# Patient Record
Sex: FEMALE | Race: WHITE | NOT HISPANIC OR LATINO | Employment: FULL TIME | URBAN - METROPOLITAN AREA
[De-identification: names, ages, dates, MRNs, and addresses within clinical notes are randomized per-mention and may not be internally consistent; named-entity substitution may affect disease eponyms.]

---

## 2024-08-14 PROBLEM — F17.200 SMOKER: Status: ACTIVE | Noted: 2024-08-14

## 2024-08-14 PROBLEM — I47.10 SVT (SUPRAVENTRICULAR TACHYCARDIA) (CMS-HCC): Status: ACTIVE | Noted: 2024-08-14

## 2024-08-15 ENCOUNTER — LAB (OUTPATIENT)
Dept: LAB | Facility: LAB | Age: 44
End: 2024-08-15
Payer: COMMERCIAL

## 2024-08-15 DIAGNOSIS — D50.0 IRON DEFICIENCY ANEMIA DUE TO CHRONIC BLOOD LOSS: ICD-10-CM

## 2024-08-15 DIAGNOSIS — Z00.00 WELLNESS EXAMINATION: ICD-10-CM

## 2024-08-15 LAB
ALBUMIN SERPL BCP-MCNC: 4.3 G/DL (ref 3.4–5)
ALP SERPL-CCNC: 36 U/L (ref 33–110)
ALT SERPL W P-5'-P-CCNC: 10 U/L (ref 7–45)
ANION GAP SERPL CALC-SCNC: 10 MMOL/L (ref 10–20)
AST SERPL W P-5'-P-CCNC: 13 U/L (ref 9–39)
BILIRUB SERPL-MCNC: 0.3 MG/DL (ref 0–1.2)
BUN SERPL-MCNC: 12 MG/DL (ref 6–23)
CALCIUM SERPL-MCNC: 9.1 MG/DL (ref 8.6–10.3)
CHLORIDE SERPL-SCNC: 106 MMOL/L (ref 98–107)
CHOLEST SERPL-MCNC: 165 MG/DL (ref 0–199)
CHOLESTEROL/HDL RATIO: 3.3
CO2 SERPL-SCNC: 26 MMOL/L (ref 21–32)
CREAT SERPL-MCNC: 0.78 MG/DL (ref 0.5–1.05)
EGFRCR SERPLBLD CKD-EPI 2021: >90 ML/MIN/1.73M*2
ERYTHROCYTE [DISTWIDTH] IN BLOOD BY AUTOMATED COUNT: 18 % (ref 11.5–14.5)
EST. AVERAGE GLUCOSE BLD GHB EST-MCNC: 105 MG/DL
GLUCOSE SERPL-MCNC: 95 MG/DL (ref 74–99)
HBA1C MFR BLD: 5.3 %
HCT VFR BLD AUTO: 36.2 % (ref 36–46)
HDLC SERPL-MCNC: 50.1 MG/DL
HGB BLD-MCNC: 11.4 G/DL (ref 12–16)
LDLC SERPL CALC-MCNC: 103 MG/DL
MCH RBC QN AUTO: 25.1 PG (ref 26–34)
MCHC RBC AUTO-ENTMCNC: 31.5 G/DL (ref 32–36)
MCV RBC AUTO: 80 FL (ref 80–100)
NON HDL CHOLESTEROL: 115 MG/DL (ref 0–149)
NRBC BLD-RTO: 0 /100 WBCS (ref 0–0)
PLATELET # BLD AUTO: 218 X10*3/UL (ref 150–450)
POTASSIUM SERPL-SCNC: 4.4 MMOL/L (ref 3.5–5.3)
PROT SERPL-MCNC: 7.5 G/DL (ref 6.4–8.2)
RBC # BLD AUTO: 4.55 X10*6/UL (ref 4–5.2)
SODIUM SERPL-SCNC: 138 MMOL/L (ref 136–145)
TRIGL SERPL-MCNC: 61 MG/DL (ref 0–149)
TSH SERPL-ACNC: 1.63 MIU/L (ref 0.44–3.98)
VIT B12 SERPL-MCNC: 457 PG/ML (ref 211–911)
VLDL: 12 MG/DL (ref 0–40)
WBC # BLD AUTO: 5.1 X10*3/UL (ref 4.4–11.3)

## 2024-08-15 PROCEDURE — 82728 ASSAY OF FERRITIN: CPT

## 2024-08-15 PROCEDURE — 83036 HEMOGLOBIN GLYCOSYLATED A1C: CPT

## 2024-08-15 PROCEDURE — 85027 COMPLETE CBC AUTOMATED: CPT

## 2024-08-15 PROCEDURE — 36415 COLL VENOUS BLD VENIPUNCTURE: CPT

## 2024-08-15 PROCEDURE — 80053 COMPREHEN METABOLIC PANEL: CPT

## 2024-08-15 PROCEDURE — 80061 LIPID PANEL: CPT

## 2024-08-15 PROCEDURE — 83550 IRON BINDING TEST: CPT

## 2024-08-15 PROCEDURE — 82652 VIT D 1 25-DIHYDROXY: CPT

## 2024-08-15 PROCEDURE — 84443 ASSAY THYROID STIM HORMONE: CPT

## 2024-08-15 PROCEDURE — 82607 VITAMIN B-12: CPT

## 2024-08-15 PROCEDURE — 83540 ASSAY OF IRON: CPT

## 2024-08-17 DIAGNOSIS — K62.5 BRIGHT RED RECTAL BLEEDING: Primary | ICD-10-CM

## 2024-08-17 DIAGNOSIS — D50.0 IRON DEFICIENCY ANEMIA DUE TO CHRONIC BLOOD LOSS: ICD-10-CM

## 2024-08-17 LAB
1,25(OH)2D SERPL-MCNC: 39.6 PG/ML (ref 19.9–79.3)
FERRITIN SERPL-MCNC: 9 NG/ML (ref 8–150)
IRON SATN MFR SERPL: 5 % (ref 25–45)
IRON SERPL-MCNC: 20 UG/DL (ref 35–150)
TIBC SERPL-MCNC: 417 UG/DL (ref 240–445)
UIBC SERPL-MCNC: 397 UG/DL (ref 110–370)

## 2024-08-17 RX ORDER — FERROUS SULFATE 325(65) MG
325 TABLET, DELAYED RELEASE (ENTERIC COATED) ORAL
Start: 2024-08-17 | End: 2025-08-17

## 2024-09-04 ENCOUNTER — HOSPITAL ENCOUNTER (OUTPATIENT)
Dept: RADIOLOGY | Facility: CLINIC | Age: 44
End: 2024-09-04
Payer: COMMERCIAL

## 2024-09-04 ENCOUNTER — HOSPITAL ENCOUNTER (OUTPATIENT)
Dept: RADIOLOGY | Facility: CLINIC | Age: 44
Discharge: HOME | End: 2024-09-04
Payer: COMMERCIAL

## 2024-09-04 VITALS — BODY MASS INDEX: 22.16 KG/M2 | HEIGHT: 65 IN | WEIGHT: 133 LBS

## 2024-09-04 DIAGNOSIS — Z12.39 ENCOUNTER FOR OTHER SCREENING FOR MALIGNANT NEOPLASM OF BREAST: ICD-10-CM

## 2024-09-04 DIAGNOSIS — Z12.31 SCREENING MAMMOGRAM FOR BREAST CANCER: ICD-10-CM

## 2024-09-04 PROCEDURE — 77067 SCR MAMMO BI INCL CAD: CPT

## 2024-09-17 ENCOUNTER — HOSPITAL ENCOUNTER (OUTPATIENT)
Dept: RADIOLOGY | Facility: EXTERNAL LOCATION | Age: 44
Discharge: HOME | End: 2024-09-17

## 2024-09-18 ENCOUNTER — ANESTHESIA EVENT (OUTPATIENT)
Dept: GASTROENTEROLOGY | Facility: EXTERNAL LOCATION | Age: 44
End: 2024-09-18

## 2024-09-25 ENCOUNTER — APPOINTMENT (OUTPATIENT)
Dept: GASTROENTEROLOGY | Facility: EXTERNAL LOCATION | Age: 44
End: 2024-09-25
Payer: COMMERCIAL

## 2024-09-25 ENCOUNTER — ANESTHESIA (OUTPATIENT)
Dept: GASTROENTEROLOGY | Facility: EXTERNAL LOCATION | Age: 44
End: 2024-09-25

## 2024-09-25 VITALS
OXYGEN SATURATION: 100 % | BODY MASS INDEX: 21.66 KG/M2 | HEART RATE: 70 BPM | DIASTOLIC BLOOD PRESSURE: 68 MMHG | TEMPERATURE: 97.9 F | SYSTOLIC BLOOD PRESSURE: 99 MMHG | RESPIRATION RATE: 16 BRPM | WEIGHT: 130 LBS | HEIGHT: 65 IN

## 2024-09-25 DIAGNOSIS — Z83.719 FAMILY HISTORY OF COLON POLYPS, UNSPECIFIED: ICD-10-CM

## 2024-09-25 DIAGNOSIS — K62.5 RECTAL BLEEDING: ICD-10-CM

## 2024-09-25 PROBLEM — K62.89 HYPERTROPHY, ANAL PAPILLAE: Status: ACTIVE | Noted: 2024-09-25

## 2024-09-25 PROBLEM — K64.8 INTERNAL HEMORRHOIDS: Status: ACTIVE | Noted: 2024-09-25

## 2024-09-25 LAB — PREGNANCY TEST URINE, POC: NEGATIVE

## 2024-09-25 PROCEDURE — 81025 URINE PREGNANCY TEST: CPT | Performed by: STUDENT IN AN ORGANIZED HEALTH CARE EDUCATION/TRAINING PROGRAM

## 2024-09-25 PROCEDURE — 45378 DIAGNOSTIC COLONOSCOPY: CPT | Performed by: STUDENT IN AN ORGANIZED HEALTH CARE EDUCATION/TRAINING PROGRAM

## 2024-09-25 RX ORDER — LIDOCAINE HYDROCHLORIDE 20 MG/ML
INJECTION, SOLUTION INFILTRATION; PERINEURAL AS NEEDED
Status: DISCONTINUED | OUTPATIENT
Start: 2024-09-25 | End: 2024-09-25

## 2024-09-25 RX ORDER — PROPOFOL 10 MG/ML
INJECTION, EMULSION INTRAVENOUS AS NEEDED
Status: DISCONTINUED | OUTPATIENT
Start: 2024-09-25 | End: 2024-09-25

## 2024-09-25 RX ORDER — MIDAZOLAM HYDROCHLORIDE 1 MG/ML
INJECTION, SOLUTION INTRAMUSCULAR; INTRAVENOUS AS NEEDED
Status: DISCONTINUED | OUTPATIENT
Start: 2024-09-25 | End: 2024-09-25

## 2024-09-25 RX ORDER — SODIUM CHLORIDE 9 MG/ML
20 INJECTION, SOLUTION INTRAVENOUS CONTINUOUS
Status: DISCONTINUED | OUTPATIENT
Start: 2024-09-25 | End: 2024-09-26 | Stop reason: HOSPADM

## 2024-09-25 SDOH — HEALTH STABILITY: MENTAL HEALTH: CURRENT SMOKER: 1

## 2024-09-25 ASSESSMENT — COLUMBIA-SUICIDE SEVERITY RATING SCALE - C-SSRS
1. IN THE PAST MONTH, HAVE YOU WISHED YOU WERE DEAD OR WISHED YOU COULD GO TO SLEEP AND NOT WAKE UP?: NO
6. HAVE YOU EVER DONE ANYTHING, STARTED TO DO ANYTHING, OR PREPARED TO DO ANYTHING TO END YOUR LIFE?: NO
2. HAVE YOU ACTUALLY HAD ANY THOUGHTS OF KILLING YOURSELF?: NO

## 2024-09-25 ASSESSMENT — PAIN SCALES - GENERAL
PAINLEVEL_OUTOF10: 0 - NO PAIN
PAINLEVEL_OUTOF10: 0 - NO PAIN
PAIN_LEVEL: 0
PAINLEVEL_OUTOF10: 0 - NO PAIN
PAINLEVEL_OUTOF10: 0 - NO PAIN

## 2024-09-25 ASSESSMENT — PAIN - FUNCTIONAL ASSESSMENT
PAIN_FUNCTIONAL_ASSESSMENT: 0-10
PAIN_FUNCTIONAL_ASSESSMENT: 0-10

## 2024-09-25 NOTE — DISCHARGE INSTRUCTIONS
Patient Instructions Post Procedure      The anesthetics, sedatives or narcotics which were given to you today will be acting in your body for the next 24 hours, so you might feel a little sleepy or groggy.  This feeling should slowly wear off. Carefully read and follow the instructions.     You received sedation today:  - Do not drive or operate any machinery or power tools of any kind.   - No alcoholic beverages today, not even beer or wine.  - Do not make any important decisions or sign any legal documents.  - No over the counter medications that contain alcohol or that may cause drowsiness.    While it is common to experience mild to moderate abdominal distention, gas, or belching after your procedure, if any of these symptoms occur following discharge from the GI Lab or within one week of having your procedure, call the [facility] to be advised whether a visit to your nearest Urgent Care or Emergency Department is indicated.  Take this paper with you if you go.   - If you develop an allergic reaction to the medications that were given during your procedure such as difficulty breathing, rash, hives, severe nausea, vomiting or lightheadedness.  - If you experience chest pain, shortness of breath, severe abdominal pain, fevers and chills.  -If you develop signs and symptoms of bleeding such as blood in your spit, if your stools turn black, tarry, or bloody  - If you have not urinated within 8 hours following your procedure.  - If your IV site becomes painful, red, inflamed, or looks infected.      Your physician recommends the additional following instructions:    -You have a contact number available for emergencies. The signs and symptoms of potential delayed complications were discussed with you. You may return to normal activities tomorrow.  -Resume your previous diet or other if specified.  -Continue your present medications.   -We are waiting for your pathology results, if applicable.  -The  "findings and recommendations have been discussed with you and/or family.  - Please see Medication Reconciliation Form for new medication/medications prescribed.     If you experience any problems or have any questions following discharge from the GI Lab, please call: [phone] from 7 am- 4:30 pm.  In the event of an emergency please go to the closest Emergency Department or call Dr. Ro: [phone]    You will receive a follow up text message tomorrow morning, if you are in need of a call back to address a non urgent question please respond with \"YES\", and we will call you the following business day Monday through Friday. If you do not need to a call back please respond with the word \"NO\" and we will remove you from the call back list.    "

## 2024-09-25 NOTE — ANESTHESIA PREPROCEDURE EVALUATION
Patient: Giovanna Faith    Procedure Information       Date/Time: 09/25/24 1010    Scheduled providers: Kalpesh Ro DO    Procedure: COLONOSCOPY    Location: [facility]            Relevant Problems   Anesthesia (within normal limits)      Cardiac   (+) SVT (supraventricular tachycardia) (CMS-HCC)      Pulmonary (within normal limits)      Neuro (within normal limits)      GI   (+) Bright red rectal bleeding      /Renal (within normal limits)      Liver (within normal limits)      Endocrine (within normal limits)      Hematology   (+) Iron deficiency anemia due to chronic blood loss   Svt >5 years ago  Hcg negative, smoker 1 ppd x 25  Clinical information reviewed:   Tobacco  Allergies  Meds   Med Hx  Surg Hx  OB Status  Fam Hx  Soc   Hx        NPO Detail:  NPO/Void Status  Date of Last Liquid: 09/25/24  Time of Last Liquid: 0530  Date of Last Solid: 09/23/24  Last Intake Type: Clear fluids         Physical Exam    Airway  Mallampati: I  TM distance: >3 FB  Neck ROM: full     Cardiovascular   Rhythm: regular     Dental    Pulmonary   Breath sounds clear to auscultation     Abdominal            Anesthesia Plan    History of general anesthesia?: yes  History of complications of general anesthesia?: no    ASA 2     MAC     The patient is a current smoker.  Patient smoked on day of procedure.    intravenous induction   Anesthetic plan and risks discussed with patient.

## 2024-09-25 NOTE — H&P
Outpatient NR Procedure H&P    Patient Profile  Name Giovanna Faith  Date of Birth 1980  MRN [id number]  PCP Maritza Sy        Diagnosis: Rectal bleeding.  Procedure(s):  Colonoscopy.    Allergies  No Known Allergies    Past Medical History   Past Medical History:   Diagnosis Date    SVT (supraventricular tachycardia) (CMS-[facility])        Medication Reviewed - yes  Prior to Admission medications    Medication Sig Start Date End Date Taking? Authorizing Provider   ferrous sulfate 325 (65 Fe) MG EC tablet Take 1 tablet by mouth once daily with breakfast. Do not crush, chew, or split. 8/17/24 8/17/25 Yes MARILIN Carrasco   buPROPion SR (Wellbutrin SR) 150 mg 12 hr tablet Take 1 tablet (150 mg) by mouth once daily for 3 days, THEN 1 tablet (150 mg) 2 times a day. Do not crush, chew, or split.. 8/14/24 11/9/24  MARILIN Carrasco       Physical Exam  Vitals:    09/25/24 0954   BP: 108/77   Pulse: 80   Resp: 15   Temp: 36.8 °C (98.2 °F)   SpO2: 100%      Weight   Vitals:    09/25/24 0954   Weight: 59 kg (130 lb)     BMI Body mass index is 21.63 kg/m².    General: A&Ox3, NAD.  HEENT: AT/NC.   CV: RRR. No murmur.  Resp: CTA bilaterally. No wheezing, rhonchi or rales.   GI: Soft, NT/ND.   Extrem: No edema. Pulses intact.  Skin: No Jaundice.   Neuro: No focal deficits.   Psych: Normal mood and affect.        Oropharyngeal Classification I (soft palate, uvula, fauces, and tonsillar pillars visible)  ASA PS Classification 2  Sedation Plan: Deep Sedation.  Procedure Plan - pre-procedural (re)assesment completed by physician:  discharge/transfer patient when discharge criteria met    Kalpesh Ro DO  9/25/2024 10:08 AM

## 2024-09-25 NOTE — ANESTHESIA POSTPROCEDURE EVALUATION
Patient: Giovanna Faith    Procedure Summary       Date: 09/25/24 Room / Location: [facility]    Anesthesia Start: 1016 Anesthesia Stop: 1034    Procedure: COLONOSCOPY Diagnosis: Rectal bleeding    Scheduled Providers: Kalpesh Ro DO Responsible Provider: CHRYSTAL Rosen    Anesthesia Type: MAC ASA Status: 2            Anesthesia Type: MAC    Vitals Value Taken Time   BP 99/68 09/25/24 1100   Temp 36.6 °C (97.9 °F) 09/25/24 1035   Pulse 70 09/25/24 1100   Resp 16 09/25/24 1100   SpO2 100 % 09/25/24 1100       Anesthesia Post Evaluation    Patient location during evaluation: bedside  Patient participation: complete - patient participated  Level of consciousness: awake and alert  Pain score: 0  Pain management: adequate  Airway patency: patent  Cardiovascular status: acceptable  Respiratory status: acceptable  Hydration status: acceptable  Postoperative Nausea and Vomiting: none        No notable events documented.

## 2024-10-28 DIAGNOSIS — Z71.6 ENCOUNTER FOR SMOKING CESSATION COUNSELING: ICD-10-CM

## 2024-10-28 RX ORDER — BUPROPION HYDROCHLORIDE 150 MG/1
150 TABLET, EXTENDED RELEASE ORAL 2 TIMES DAILY
Qty: 60 TABLET | Refills: 1 | Status: SHIPPED | OUTPATIENT
Start: 2024-10-28 | End: 2024-12-27

## 2024-11-20 ENCOUNTER — APPOINTMENT (OUTPATIENT)
Dept: PRIMARY CARE | Facility: CLINIC | Age: 44
End: 2024-11-20
Payer: COMMERCIAL

## 2024-11-20 VITALS
SYSTOLIC BLOOD PRESSURE: 118 MMHG | HEART RATE: 92 BPM | OXYGEN SATURATION: 95 % | BODY MASS INDEX: 23.49 KG/M2 | DIASTOLIC BLOOD PRESSURE: 72 MMHG | WEIGHT: 141 LBS | RESPIRATION RATE: 18 BRPM | HEIGHT: 65 IN

## 2024-11-20 DIAGNOSIS — F41.1 GENERALIZED ANXIETY DISORDER: ICD-10-CM

## 2024-11-20 DIAGNOSIS — Z87.891 FORMER SMOKER: ICD-10-CM

## 2024-11-20 DIAGNOSIS — K62.89 HYPERTROPHY, ANAL PAPILLAE: ICD-10-CM

## 2024-11-20 DIAGNOSIS — R00.2 PALPITATIONS: ICD-10-CM

## 2024-11-20 DIAGNOSIS — K62.5 BRIGHT RED RECTAL BLEEDING: ICD-10-CM

## 2024-11-20 DIAGNOSIS — I47.10 SVT (SUPRAVENTRICULAR TACHYCARDIA) (CMS-HCC): ICD-10-CM

## 2024-11-20 DIAGNOSIS — K64.8 INTERNAL HEMORRHOIDS: Primary | ICD-10-CM

## 2024-11-20 DIAGNOSIS — D50.0 IRON DEFICIENCY ANEMIA DUE TO CHRONIC BLOOD LOSS: ICD-10-CM

## 2024-11-20 PROBLEM — F17.200 SMOKER: Status: RESOLVED | Noted: 2024-08-14 | Resolved: 2024-11-20

## 2024-11-20 PROCEDURE — 99214 OFFICE O/P EST MOD 30 MIN: CPT | Performed by: NURSE PRACTITIONER

## 2024-11-20 ASSESSMENT — ENCOUNTER SYMPTOMS
CONSTIPATION: 1
SLEEP DISTURBANCE: 0
ABDOMINAL DISTENTION: 1
ANAL BLEEDING: 1
SHORTNESS OF BREATH: 1
PALPITATIONS: 1
COUGH: 0
FEVER: 0
NERVOUS/ANXIOUS: 1
DECREASED CONCENTRATION: 0
UNEXPECTED WEIGHT CHANGE: 0
CHEST TIGHTNESS: 0
CHILLS: 0
HEADACHES: 1
DYSPHORIC MOOD: 0
FATIGUE: 0
APPETITE CHANGE: 0
ABDOMINAL PAIN: 0
WHEEZING: 0

## 2024-11-20 NOTE — ASSESSMENT & PLAN NOTE
Pt. Used Wellbutrin as an aide for smoking cessation and is starting her 3rd month of that, then will discontinue. Advised her on how to wean off over the course of 2 weeks on her last prescription fill.

## 2024-11-20 NOTE — ASSESSMENT & PLAN NOTE
Patient states this is mild and manageable. She does not see any difference while on Wellbutrin for smoking cessation. She does not want other medication for this currently, but may consider in the future if symptoms worsen.

## 2024-11-20 NOTE — ASSESSMENT & PLAN NOTE
Will see cardiology, Dr. Young, 12/19/24 for eval.  Anemia is very mild and not likely contributing.

## 2024-11-20 NOTE — PROGRESS NOTES
Subjective   Giovanna Faith is a 44 y.o. female who presents for Follow-up (Patient is here to follow-up and discuss recent labs, mammogram & colonoscopy results. ).    HPI  Review of Systems   Constitutional:  Negative for appetite change, chills, fatigue, fever and unexpected weight change.   Eyes:  Positive for visual disturbance (Had recent double vision, saw eye doctor. Getting bifocals.).   Respiratory:  Positive for shortness of breath (occasion, worse with exertion. 1 ppd Smoker). Negative for cough, chest tightness and wheezing.    Cardiovascular:  Positive for palpitations (occasional.). Negative for chest pain and leg swelling.   Gastrointestinal:  Positive for abdominal distention, anal bleeding (bright red, intermittent, h/o hemorrhoids) and constipation. Negative for abdominal pain.   Genitourinary:  Positive for menstrual problem (skipped a few periods in early 2024, but having them again fairly regularly/monthly. Has occasional night sweats. Not on birth control.). Negative for pelvic pain.   Neurological:  Positive for headaches (occasional, sometimes lasts a few days, occurs around menstrual cycle, described as a dull teodora. frontal headache.).   Psychiatric/Behavioral:  Negative for decreased concentration, dysphoric mood (reports past bouts of depression, but states doing well currently.) and sleep disturbance. The patient is nervous/anxious (mild).    Objective     Physical Exam  Vitals reviewed.   Constitutional:       Appearance: Normal appearance. She is not ill-appearing.   HENT:      Head: Normocephalic.   Eyes:      Conjunctiva/sclera: Conjunctivae normal.   Cardiovascular:      Rate and Rhythm: Normal rate and regular rhythm.      Pulses: Normal pulses.      Heart sounds: No murmur heard.  Pulmonary:      Effort: Pulmonary effort is normal.      Breath sounds: Normal breath sounds.   Abdominal:      General: Bowel sounds are normal.      Palpations: Abdomen is soft.   Musculoskeletal:       "Cervical back: Neck supple. No tenderness.      Right lower leg: No edema.      Left lower leg: No edema.   Lymphadenopathy:      Cervical: No cervical adenopathy.   Skin:     General: Skin is warm and dry.   Neurological:      General: No focal deficit present.      Mental Status: She is alert and oriented to person, place, and time.   Psychiatric:         Mood and Affect: Mood normal.         Thought Content: Thought content normal.     /72   Pulse 92   Resp 18   Ht 1.651 m (5' 5\")   Wt 64 kg (141 lb)   SpO2 95%   BMI 23.46 kg/m²     Assessment/Plan     Problem List Items Addressed This Visit       SVT (supraventricular tachycardia) (CMS-HCC)    Current Assessment & Plan     Pt has episodes of palpitations as well, but unclear if she still has any SVT episodes. HR normal during her visits here. She is scheduled to see cardiology Dr. JAIMIE Young 12/19/24.         Bright red rectal bleeding    Current Assessment & Plan     This is mild and r/t  hemorrhoids. She is using Metamucil and Miralax for constipation, so expect this to improve.         Iron deficiency anemia due to chronic blood loss    Current Assessment & Plan     Tolerating FeSO4 325 mg daily, which we will continue.          Relevant Orders    Follow Up In [facility] - PCP - Established    Internal hemorrhoids - Primary    Overview     Colonoscopy 9/25/2024. Repeat 7 years.         Current Assessment & Plan     Still has some bleeding. Using fiber and Miralax for constipation.         Hypertrophy, anal papillae    Former smoker    Overview     Quit smoking approx 10/8/2024. She is using a \"Health Vape\" that contains vitamin C but no nicotine.         Current Assessment & Plan     Pt. Used Wellbutrin as an aide for smoking cessation and is starting her 3rd month of that, then will discontinue. Advised her on how to wean off over the course of 2 weeks on her last prescription fill.          Relevant Orders    Follow Up In [facility] - PCP - Established    Generalized anxiety disorder    Current Assessment & Plan     Patient states this is mild and manageable. She does not see any difference while on Wellbutrin for smoking cessation. She does not want other medication for this currently, but may consider in the future if symptoms worsen.          Palpitations    Current Assessment & Plan     Will see cardiology, Dr. Young, 12/19/24 for eval.  Anemia is very mild and not likely contributing.

## 2024-11-20 NOTE — ASSESSMENT & PLAN NOTE
Pt has episodes of palpitations as well, but unclear if she still has any SVT episodes. HR normal during her visits here. She is scheduled to see cardiology Dr. JAIMIE Young 12/19/24.

## 2024-11-20 NOTE — ASSESSMENT & PLAN NOTE
This is mild and r/t  hemorrhoids. She is using Metamucil and Miralax for constipation, so expect this to improve.

## 2024-12-19 ENCOUNTER — OFFICE VISIT (OUTPATIENT)
Dept: CARDIOLOGY | Facility: CLINIC | Age: 44
End: 2024-12-19
Payer: COMMERCIAL

## 2024-12-19 VITALS
BODY MASS INDEX: 23.16 KG/M2 | SYSTOLIC BLOOD PRESSURE: 102 MMHG | OXYGEN SATURATION: 98 % | WEIGHT: 139 LBS | HEIGHT: 65 IN | HEART RATE: 89 BPM | DIASTOLIC BLOOD PRESSURE: 78 MMHG

## 2024-12-19 DIAGNOSIS — I47.10 SVT (SUPRAVENTRICULAR TACHYCARDIA) (CMS-HCC): ICD-10-CM

## 2024-12-19 DIAGNOSIS — R00.2 PALPITATIONS: Primary | ICD-10-CM

## 2024-12-19 DIAGNOSIS — I49.3 PVC (PREMATURE VENTRICULAR CONTRACTION): ICD-10-CM

## 2024-12-19 PROCEDURE — 93010 ELECTROCARDIOGRAM REPORT: CPT | Performed by: INTERNAL MEDICINE

## 2024-12-19 PROCEDURE — 99203 OFFICE O/P NEW LOW 30 MIN: CPT | Performed by: INTERNAL MEDICINE

## 2024-12-19 PROCEDURE — 3008F BODY MASS INDEX DOCD: CPT | Performed by: INTERNAL MEDICINE

## 2024-12-19 PROCEDURE — 93005 ELECTROCARDIOGRAM TRACING: CPT | Performed by: INTERNAL MEDICINE

## 2024-12-19 PROCEDURE — 99213 OFFICE O/P EST LOW 20 MIN: CPT | Mod: 25 | Performed by: INTERNAL MEDICINE

## 2024-12-19 NOTE — PROGRESS NOTES
Chief Complaint:   No chief complaint on file.     History Of Present Illness:    Giovanna Faith is a 44 y.o. female with a history of SVT, PVCs, and palpitations here for cardiovascular evaluation.    Patient established with her primary care provider who noticed something unusual with ECG and asked her to see a cardiologist.    Patient was seen by cardiology 10 years ago when she was having palpitations.  By the time she actually saw the cardiologist her palpitations had significantly improved.  She underwent testing as noted below.  There is no significant arrhythmia noted.  She was started on beta-blockers however stopped them when she noticed that they were not helping.    Since that time she has had intermittent palpitations.  Can occur throughout the day or when she is laying down at night.  Only lasts a short period of time and are not associated with lightheadedness or feeling like she is in a pass out.  Have not changed over the past several years.    She is on Wellbutrin to try to quit smoking.  She does not think that this is Neveah long-term medication.  She has not noticed an increase in palpitations while being on Wellbutrin.    She is accompanied by her daughter who is a nursing student at [employer] and does her clinicals at [employer].    ECG 8/14/2024 demonstrating sinus rhythm with R/S transition between V5 and V6.    Echocardiogram 12/4/2014: EF 55 to 60%.  Normal left atrial size.    Treadmill nuclear stress test 12/29/2014: Exercised 12.5 minutes achieving 14 METS.  No ECG changes.  EF 79%.  No evidence of ischemia or scar.    24-hour Holter monitor November 2014: Predominantly sinus rhythm with average heart rate 74 bpm ( bpm).  Rare PVCs. One 3-beat run of SVT at 91 bpm.     Past Medical History:  She has a past medical history of Anemia (?), Closed fracture of pubis (Multi) (08/23/2013), Dizziness (01/09/2015), Headache, Smoker (08/14/2024), and SVT  "(supraventricular tachycardia) (CMS-HCC).    Past Surgical History:  She has a past surgical history that includes Tonsillectomy (2002); Dilation and curettage of uterus; and Tubal ligation.      Social History:  She reports that she quit smoking about 2 months ago. Her smoking use included cigarettes. She started smoking about 30 years ago. She has a 30 pack-year smoking history. She has been exposed to tobacco smoke. She has never used smokeless tobacco. She reports current alcohol use. She reports that she does not use drugs.    Family History:  Family History   Problem Relation Name Age of Onset    Atrial fibrillation Mother Janett     Thyroid disease Mother Janett     Other (hepatitis c) Mother Janett     Stomach cancer Father's Brother      Stomach cancer Paternal Grandmother Ivisburga     Cancer Paternal Grandmother Juan Ra     Cancer Maternal Grandfather Asher     Arthritis Maternal Grandmother Christina     Miscarriages / Stillbirths Maternal Grandmother Christina     Diabetes Mother's Sister Nadya     Diabetes Mother's Brother Varghese         Allergies:  Patient has no known allergies.    Outpatient Medications:  Current Outpatient Medications   Medication Instructions    buPROPion SR (WELLBUTRIN SR) 150 mg, oral, 2 times daily, Do not crush, chew, or split.    ferrous sulfate 325 (65 Fe) MG EC tablet 1 tablet, oral, Daily with breakfast, Do not crush, chew, or split.       Last Recorded Vitals:  Visit Vitals  /78 (BP Location: Left arm, Patient Position: Sitting)   Pulse 89   Ht 1.651 m (5' 5\")   Wt 63 kg (139 lb)   SpO2 98%   BMI 23.13 kg/m²   OB Status Having periods   Smoking Status Former   BSA 1.7 m²      LASTWT(3):   Wt Readings from Last 3 Encounters:   12/19/24 63 kg (139 lb)   11/20/24 64 kg (141 lb)   09/25/24 59 kg (130 lb)       Physical Exam:  In general: alert and in no acute distress.   HEENT: Carotid upstrokes normal with no bruits. JVP is normal.   Pulmonary: Clear to auscultation " bilaterally.  Cardiovascular: S1, S2, regular. No appreciable murmurs, rubs or gallops.   Lower extremities: Warm. 2+ distal pulses. No edema.     Last Labs:  CBC -  Recent Labs     08/15/24  0939   WBC 5.1   HGB 11.4*   HCT 36.2      MCV 80       CMP -  Recent Labs     08/15/24  0939      K 4.4      CO2 26   ANIONGAP 10   BUN 12   CREATININE 0.78   EGFR >90     Recent Labs     08/15/24  0939   ALBUMIN 4.3   ALKPHOS 36   ALT 10   AST 13   BILITOT 0.3       LIPID PANEL -   Recent Labs     08/15/24  0939   CHOL 165   LDLCALC 103*   HDL 50.1   TRIG 61       Recent Labs     08/15/24  0939   HGBA1C 5.3           Assessment/Plan   1) palpitations: Likely secondary to PACs and PVCs.  No significant change over the past several years.  No need to treat with medical therapy at this time.  Have reassured the patient.  If palpitations become more frequent, last longer or are more severe she can always call.      2) PVCs: See above    3) SVT: Holter monitoring 2014 demonstrated a 3 beat run of nonsustained SVT.  No significant palpitations recently.  See above.    4) abnormal ECG: Late R/S transition likely secondary to lead placement.  ECG today with no significant abnormality.    5) follow-up: As needed      Rene Young MD

## 2025-05-21 ENCOUNTER — APPOINTMENT (OUTPATIENT)
Dept: PRIMARY CARE | Facility: CLINIC | Age: 45
End: 2025-05-21
Payer: COMMERCIAL

## 2025-05-21 VITALS
WEIGHT: 150 LBS | OXYGEN SATURATION: 96 % | HEART RATE: 105 BPM | HEIGHT: 65 IN | BODY MASS INDEX: 24.99 KG/M2 | RESPIRATION RATE: 17 BRPM | TEMPERATURE: 98.9 F | SYSTOLIC BLOOD PRESSURE: 122 MMHG | DIASTOLIC BLOOD PRESSURE: 87 MMHG

## 2025-05-21 DIAGNOSIS — J01.90 ACUTE BACTERIAL SINUSITIS: ICD-10-CM

## 2025-05-21 DIAGNOSIS — R82.90 ABNORMAL URINE SEDIMENT: ICD-10-CM

## 2025-05-21 DIAGNOSIS — B96.89 ACUTE BACTERIAL SINUSITIS: ICD-10-CM

## 2025-05-21 DIAGNOSIS — N95.1 PERIMENOPAUSAL SYMPTOMS: Primary | ICD-10-CM

## 2025-05-21 DIAGNOSIS — D50.0 IRON DEFICIENCY ANEMIA DUE TO CHRONIC BLOOD LOSS: ICD-10-CM

## 2025-05-21 DIAGNOSIS — Z87.891 FORMER SMOKER: ICD-10-CM

## 2025-05-21 PROBLEM — I47.10 SVT (SUPRAVENTRICULAR TACHYCARDIA): Status: RESOLVED | Noted: 2024-08-14 | Resolved: 2025-05-21

## 2025-05-21 LAB
POC APPEARANCE, URINE: CLEAR
POC BILIRUBIN, URINE: NEGATIVE
POC BLOOD, URINE: NEGATIVE
POC COLOR, URINE: YELLOW
POC GLUCOSE, URINE: NEGATIVE MG/DL
POC KETONES, URINE: NEGATIVE MG/DL
POC LEUKOCYTES, URINE: NEGATIVE
POC NITRITE,URINE: NEGATIVE
POC PH, URINE: 6.5 PH
POC PROTEIN, URINE: NEGATIVE MG/DL
POC SPECIFIC GRAVITY, URINE: 1.02
POC UROBILINOGEN, URINE: 0.2 EU/DL

## 2025-05-21 PROCEDURE — 81003 URINALYSIS AUTO W/O SCOPE: CPT | Performed by: NURSE PRACTITIONER

## 2025-05-21 PROCEDURE — 3008F BODY MASS INDEX DOCD: CPT | Performed by: NURSE PRACTITIONER

## 2025-05-21 PROCEDURE — 99214 OFFICE O/P EST MOD 30 MIN: CPT | Performed by: NURSE PRACTITIONER

## 2025-05-21 PROCEDURE — 1036F TOBACCO NON-USER: CPT | Performed by: NURSE PRACTITIONER

## 2025-05-21 RX ORDER — AMOXICILLIN AND CLAVULANATE POTASSIUM 875; 125 MG/1; MG/1
1 TABLET, FILM COATED ORAL 2 TIMES DAILY
Qty: 14 TABLET | Refills: 0 | Status: SHIPPED | OUTPATIENT
Start: 2025-05-21 | End: 2025-05-28

## 2025-05-21 ASSESSMENT — ANXIETY QUESTIONNAIRES
5. BEING SO RESTLESS THAT IT IS HARD TO SIT STILL: NOT AT ALL
4. TROUBLE RELAXING: SEVERAL DAYS
GAD7 TOTAL SCORE: 2
1. FEELING NERVOUS, ANXIOUS, OR ON EDGE: NOT AT ALL
6. BECOMING EASILY ANNOYED OR IRRITABLE: SEVERAL DAYS
2. NOT BEING ABLE TO STOP OR CONTROL WORRYING: NOT AT ALL
3. WORRYING TOO MUCH ABOUT DIFFERENT THINGS: NOT AT ALL
IF YOU CHECKED OFF ANY PROBLEMS ON THIS QUESTIONNAIRE, HOW DIFFICULT HAVE THESE PROBLEMS MADE IT FOR YOU TO DO YOUR WORK, TAKE CARE OF THINGS AT HOME, OR GET ALONG WITH OTHER PEOPLE: NOT DIFFICULT AT ALL
7. FEELING AFRAID AS IF SOMETHING AWFUL MIGHT HAPPEN: NOT AT ALL

## 2025-05-21 ASSESSMENT — ENCOUNTER SYMPTOMS
COUGH: 1
WHEEZING: 0
FEVER: 0
SWEATS: 0
RHINORRHEA: 1
CHILLS: 0
HEADACHES: 1
SORE THROAT: 1
SHORTNESS OF BREATH: 0

## 2025-08-20 ENCOUNTER — APPOINTMENT (OUTPATIENT)
Dept: PRIMARY CARE | Facility: CLINIC | Age: 45
End: 2025-08-20
Payer: COMMERCIAL

## 2025-09-12 ENCOUNTER — APPOINTMENT (OUTPATIENT)
Dept: PRIMARY CARE | Facility: CLINIC | Age: 45
End: 2025-09-12
Payer: COMMERCIAL